# Patient Record
Sex: MALE | Race: WHITE
[De-identification: names, ages, dates, MRNs, and addresses within clinical notes are randomized per-mention and may not be internally consistent; named-entity substitution may affect disease eponyms.]

---

## 2018-07-24 ENCOUNTER — HOSPITAL ENCOUNTER (OUTPATIENT)
Dept: HOSPITAL 96 - M.WC | Age: 48
End: 2018-07-24
Attending: EMERGENCY MEDICINE
Payer: COMMERCIAL

## 2018-07-24 DIAGNOSIS — L97.511: ICD-10-CM

## 2018-07-24 DIAGNOSIS — E11.621: Primary | ICD-10-CM

## 2018-07-24 DIAGNOSIS — E78.00: ICD-10-CM

## 2018-07-24 DIAGNOSIS — Z87.891: ICD-10-CM

## 2018-07-24 DIAGNOSIS — E78.5: ICD-10-CM

## 2018-07-24 DIAGNOSIS — I10: ICD-10-CM

## 2018-07-26 ENCOUNTER — HOSPITAL ENCOUNTER (OUTPATIENT)
Dept: HOSPITAL 96 - M.ULTRA | Age: 48
End: 2018-07-26
Attending: EMERGENCY MEDICINE
Payer: COMMERCIAL

## 2018-07-26 DIAGNOSIS — I70.8: Primary | ICD-10-CM

## 2018-07-26 DIAGNOSIS — I73.9: ICD-10-CM

## 2018-07-31 ENCOUNTER — HOSPITAL ENCOUNTER (OUTPATIENT)
Dept: HOSPITAL 96 - M.WC | Age: 48
End: 2018-07-31
Attending: EMERGENCY MEDICINE
Payer: COMMERCIAL

## 2018-07-31 DIAGNOSIS — E78.5: ICD-10-CM

## 2018-07-31 DIAGNOSIS — E11.40: ICD-10-CM

## 2018-07-31 DIAGNOSIS — Z85.820: ICD-10-CM

## 2018-07-31 DIAGNOSIS — E11.621: Primary | ICD-10-CM

## 2018-07-31 DIAGNOSIS — I10: ICD-10-CM

## 2018-07-31 DIAGNOSIS — L97.511: ICD-10-CM

## 2018-07-31 DIAGNOSIS — E78.00: ICD-10-CM

## 2018-07-31 DIAGNOSIS — Z87.891: ICD-10-CM

## 2018-08-08 ENCOUNTER — HOSPITAL ENCOUNTER (OUTPATIENT)
Dept: HOSPITAL 96 - M.WC | Age: 48
End: 2018-08-08
Attending: SURGERY
Payer: COMMERCIAL

## 2018-08-08 DIAGNOSIS — E11.621: Primary | ICD-10-CM

## 2018-08-08 DIAGNOSIS — Z85.820: ICD-10-CM

## 2018-08-08 DIAGNOSIS — I10: ICD-10-CM

## 2018-08-08 DIAGNOSIS — I70.235: ICD-10-CM

## 2018-08-08 DIAGNOSIS — E78.5: ICD-10-CM

## 2018-08-08 DIAGNOSIS — Z87.891: ICD-10-CM

## 2018-08-08 DIAGNOSIS — L97.511: ICD-10-CM

## 2018-08-08 DIAGNOSIS — E78.00: ICD-10-CM

## 2018-08-15 ENCOUNTER — HOSPITAL ENCOUNTER (OUTPATIENT)
Dept: HOSPITAL 96 - M.WC | Age: 48
End: 2018-08-15
Attending: SURGERY
Payer: COMMERCIAL

## 2018-08-15 DIAGNOSIS — Z85.820: ICD-10-CM

## 2018-08-15 DIAGNOSIS — I12.9: ICD-10-CM

## 2018-08-15 DIAGNOSIS — E78.00: ICD-10-CM

## 2018-08-15 DIAGNOSIS — L97.511: ICD-10-CM

## 2018-08-15 DIAGNOSIS — E11.22: ICD-10-CM

## 2018-08-15 DIAGNOSIS — Z79.4: ICD-10-CM

## 2018-08-15 DIAGNOSIS — N18.9: ICD-10-CM

## 2018-08-15 DIAGNOSIS — I70.235: ICD-10-CM

## 2018-08-15 DIAGNOSIS — E11.621: Primary | ICD-10-CM

## 2018-08-15 DIAGNOSIS — Z87.891: ICD-10-CM

## 2018-08-15 DIAGNOSIS — E78.5: ICD-10-CM

## 2018-08-15 DIAGNOSIS — Z94.0: ICD-10-CM

## 2018-08-22 ENCOUNTER — HOSPITAL ENCOUNTER (OUTPATIENT)
Dept: HOSPITAL 96 - M.WC | Age: 48
End: 2018-08-22
Attending: SURGERY
Payer: COMMERCIAL

## 2018-08-22 DIAGNOSIS — I70.235: ICD-10-CM

## 2018-08-22 DIAGNOSIS — E66.9: ICD-10-CM

## 2018-08-22 DIAGNOSIS — E78.00: ICD-10-CM

## 2018-08-22 DIAGNOSIS — E11.621: Primary | ICD-10-CM

## 2018-08-22 DIAGNOSIS — Z79.4: ICD-10-CM

## 2018-08-22 DIAGNOSIS — E11.40: ICD-10-CM

## 2018-08-22 DIAGNOSIS — Z94.0: ICD-10-CM

## 2018-08-22 DIAGNOSIS — I12.9: ICD-10-CM

## 2018-08-22 DIAGNOSIS — E11.22: ICD-10-CM

## 2018-08-22 DIAGNOSIS — L97.511: ICD-10-CM

## 2018-08-22 DIAGNOSIS — Z87.891: ICD-10-CM

## 2018-08-22 DIAGNOSIS — Z85.820: ICD-10-CM

## 2018-08-22 DIAGNOSIS — E78.5: ICD-10-CM

## 2018-08-22 DIAGNOSIS — N18.9: ICD-10-CM

## 2021-01-05 ENCOUNTER — HOSPITAL ENCOUNTER (OUTPATIENT)
Dept: HOSPITAL 96 - M.WC | Age: 51
End: 2021-01-05
Attending: EMERGENCY MEDICINE
Payer: COMMERCIAL

## 2021-01-05 DIAGNOSIS — E78.5: ICD-10-CM

## 2021-01-05 DIAGNOSIS — L84: ICD-10-CM

## 2021-01-05 DIAGNOSIS — Z87.891: ICD-10-CM

## 2021-01-05 DIAGNOSIS — E10.621: Primary | ICD-10-CM

## 2021-01-05 DIAGNOSIS — I70.235: ICD-10-CM

## 2021-01-05 DIAGNOSIS — E10.319: ICD-10-CM

## 2021-01-05 DIAGNOSIS — Z85.820: ICD-10-CM

## 2021-01-05 DIAGNOSIS — I10: ICD-10-CM

## 2021-01-05 DIAGNOSIS — E78.00: ICD-10-CM

## 2021-01-05 DIAGNOSIS — L97.514: ICD-10-CM

## 2021-01-05 DIAGNOSIS — E10.51: ICD-10-CM

## 2021-01-11 ENCOUNTER — HOSPITAL ENCOUNTER (OUTPATIENT)
Dept: HOSPITAL 96 - M.WC | Age: 51
End: 2021-01-11
Attending: EMERGENCY MEDICINE
Payer: COMMERCIAL

## 2021-01-11 DIAGNOSIS — Z94.83: ICD-10-CM

## 2021-01-11 DIAGNOSIS — E10.319: ICD-10-CM

## 2021-01-11 DIAGNOSIS — I10: ICD-10-CM

## 2021-01-11 DIAGNOSIS — Z94.0: ICD-10-CM

## 2021-01-11 DIAGNOSIS — E10.51: ICD-10-CM

## 2021-01-11 DIAGNOSIS — E78.00: ICD-10-CM

## 2021-01-11 DIAGNOSIS — E78.5: ICD-10-CM

## 2021-01-11 DIAGNOSIS — E10.40: ICD-10-CM

## 2021-01-11 DIAGNOSIS — E10.621: Primary | ICD-10-CM

## 2021-01-11 DIAGNOSIS — Z85.820: ICD-10-CM

## 2021-01-11 DIAGNOSIS — L84: ICD-10-CM

## 2021-01-11 DIAGNOSIS — L97.514: ICD-10-CM

## 2021-01-11 DIAGNOSIS — Z87.891: ICD-10-CM

## 2021-01-11 DIAGNOSIS — I70.235: ICD-10-CM

## 2021-01-11 NOTE — EKG
[place] [address]
Phone:  [phone]                     ELECTROCARDIOGRAM REPORT      
_______________________________________________________________________________
 
Name:         SHELLY MCCONNELL                  Room:                     REG CLI
M.R.#:    [id number]     Account #:     [id number]  
Admission:    21    Attend Phys:   Real Archer,
Discharge:                Date of Birth: [birth year]  
Date of Service: 21 1437  Report #:      [id number]
        [id number]
_______________________________________________________________________________
THIS REPORT FOR:  //name//                      
 
                          [facility]
                                       
Test Date:    2021               Test Time:    14:37:14
Pat Name:     SHELLY MCCONNELL               Department:   
Patient ID:   [id number]            Room:          
Gender:                               Technician:   
:          [id number]               Requested By: Real Archer
Order Number: [id number]    Lelo MD:   Catarino Smith
                                 Measurements
Intervals                              Axis          
Rate:         102                      P:            51
SD:           139                      QRS:          21
QRSD:         90                       T:            63
QT:           308                                    
QTc:          402                                    
                           Interpretive Statements
Sinus tachycardia
No previous ECG available for comparison
Electronically Signed On 2021 16:20:51 CST by Catarino Smith
[email or web address]
 
 
 
 
 
 
 
 
 
 
 
 
 
 
 
 
 
 
 
 
 
 
  <ELECTRONICALLY SIGNED>
                                           By: Catarino Smith MD, [facility]     
  21     1620
D: 21 1437   _____________________________________
T: 21   Catarino Smith MD, FACC       /EPI

## 2021-01-12 ENCOUNTER — HOSPITAL ENCOUNTER (OUTPATIENT)
Dept: HOSPITAL 96 - M.WC | Age: 51
End: 2021-01-12
Attending: EMERGENCY MEDICINE
Payer: COMMERCIAL

## 2021-01-12 DIAGNOSIS — E78.00: ICD-10-CM

## 2021-01-12 DIAGNOSIS — L97.514: ICD-10-CM

## 2021-01-12 DIAGNOSIS — E10.621: Primary | ICD-10-CM

## 2021-01-12 DIAGNOSIS — I10: ICD-10-CM

## 2021-01-12 DIAGNOSIS — Z94.83: ICD-10-CM

## 2021-01-12 DIAGNOSIS — I70.235: ICD-10-CM

## 2021-01-12 DIAGNOSIS — E10.51: ICD-10-CM

## 2021-01-12 DIAGNOSIS — Z87.891: ICD-10-CM

## 2021-01-12 DIAGNOSIS — Z94.0: ICD-10-CM

## 2021-01-12 DIAGNOSIS — Z85.820: ICD-10-CM

## 2021-01-12 DIAGNOSIS — E78.5: ICD-10-CM

## 2021-01-12 DIAGNOSIS — E10.319: ICD-10-CM

## 2021-01-13 ENCOUNTER — HOSPITAL ENCOUNTER (OUTPATIENT)
Dept: HOSPITAL 96 - M.WC | Age: 51
End: 2021-01-13
Attending: SURGERY
Payer: COMMERCIAL

## 2021-01-13 DIAGNOSIS — I10: ICD-10-CM

## 2021-01-13 DIAGNOSIS — E78.5: ICD-10-CM

## 2021-01-13 DIAGNOSIS — Z94.0: ICD-10-CM

## 2021-01-13 DIAGNOSIS — Z94.83: ICD-10-CM

## 2021-01-13 DIAGNOSIS — I70.235: ICD-10-CM

## 2021-01-13 DIAGNOSIS — L97.514: ICD-10-CM

## 2021-01-13 DIAGNOSIS — Z87.891: ICD-10-CM

## 2021-01-13 DIAGNOSIS — E10.319: ICD-10-CM

## 2021-01-13 DIAGNOSIS — E78.00: ICD-10-CM

## 2021-01-13 DIAGNOSIS — Z85.820: ICD-10-CM

## 2021-01-13 DIAGNOSIS — E10.51: ICD-10-CM

## 2021-01-13 DIAGNOSIS — E10.621: Primary | ICD-10-CM

## 2021-01-14 ENCOUNTER — HOSPITAL ENCOUNTER (OUTPATIENT)
Dept: HOSPITAL 96 - M.WC | Age: 51
End: 2021-01-14
Attending: FAMILY MEDICINE
Payer: COMMERCIAL

## 2021-01-14 DIAGNOSIS — E10.621: Primary | ICD-10-CM

## 2021-01-14 DIAGNOSIS — E10.319: ICD-10-CM

## 2021-01-14 DIAGNOSIS — I10: ICD-10-CM

## 2021-01-14 DIAGNOSIS — E78.00: ICD-10-CM

## 2021-01-14 DIAGNOSIS — Z87.891: ICD-10-CM

## 2021-01-14 DIAGNOSIS — I70.235: ICD-10-CM

## 2021-01-14 DIAGNOSIS — E78.5: ICD-10-CM

## 2021-01-14 DIAGNOSIS — L97.514: ICD-10-CM

## 2021-01-14 DIAGNOSIS — E10.51: ICD-10-CM

## 2021-01-15 ENCOUNTER — HOSPITAL ENCOUNTER (OUTPATIENT)
Dept: HOSPITAL 96 - M.WC | Age: 51
End: 2021-01-15
Attending: FAMILY MEDICINE
Payer: COMMERCIAL

## 2021-01-15 DIAGNOSIS — E10.621: Primary | ICD-10-CM

## 2021-01-15 DIAGNOSIS — E78.5: ICD-10-CM

## 2021-01-15 DIAGNOSIS — E10.319: ICD-10-CM

## 2021-01-15 DIAGNOSIS — L97.514: ICD-10-CM

## 2021-01-15 DIAGNOSIS — Z87.891: ICD-10-CM

## 2021-01-15 DIAGNOSIS — I10: ICD-10-CM

## 2021-01-15 DIAGNOSIS — E10.51: ICD-10-CM

## 2021-01-15 DIAGNOSIS — I70.235: ICD-10-CM

## 2021-01-15 DIAGNOSIS — E78.00: ICD-10-CM

## 2021-01-18 ENCOUNTER — HOSPITAL ENCOUNTER (OUTPATIENT)
Dept: HOSPITAL 96 - M.WC | Age: 51
End: 2021-01-18
Payer: COMMERCIAL

## 2021-01-18 DIAGNOSIS — E10.319: ICD-10-CM

## 2021-01-18 DIAGNOSIS — E78.5: ICD-10-CM

## 2021-01-18 DIAGNOSIS — Z87.891: ICD-10-CM

## 2021-01-18 DIAGNOSIS — L97.514: ICD-10-CM

## 2021-01-18 DIAGNOSIS — E10.621: Primary | ICD-10-CM

## 2021-01-18 DIAGNOSIS — E78.00: ICD-10-CM

## 2021-01-18 DIAGNOSIS — Z94.0: ICD-10-CM

## 2021-01-18 DIAGNOSIS — I10: ICD-10-CM

## 2021-01-18 DIAGNOSIS — Z94.83: ICD-10-CM

## 2021-01-18 DIAGNOSIS — E10.51: ICD-10-CM

## 2021-01-18 DIAGNOSIS — Z85.820: ICD-10-CM

## 2021-01-18 DIAGNOSIS — I70.235: ICD-10-CM

## 2021-01-19 ENCOUNTER — HOSPITAL ENCOUNTER (OUTPATIENT)
Dept: HOSPITAL 96 - M.WC | Age: 51
End: 2021-01-19
Attending: EMERGENCY MEDICINE
Payer: COMMERCIAL

## 2021-01-19 DIAGNOSIS — Z94.83: ICD-10-CM

## 2021-01-19 DIAGNOSIS — E10.319: ICD-10-CM

## 2021-01-19 DIAGNOSIS — L97.514: ICD-10-CM

## 2021-01-19 DIAGNOSIS — I10: ICD-10-CM

## 2021-01-19 DIAGNOSIS — E10.621: Primary | ICD-10-CM

## 2021-01-19 DIAGNOSIS — Z87.891: ICD-10-CM

## 2021-01-19 DIAGNOSIS — Z94.0: ICD-10-CM

## 2021-01-19 DIAGNOSIS — I70.235: ICD-10-CM

## 2021-01-19 DIAGNOSIS — E78.5: ICD-10-CM

## 2021-01-19 DIAGNOSIS — Z85.820: ICD-10-CM

## 2021-01-19 DIAGNOSIS — E10.51: ICD-10-CM

## 2021-01-19 DIAGNOSIS — E78.00: ICD-10-CM

## 2021-01-20 ENCOUNTER — HOSPITAL ENCOUNTER (OUTPATIENT)
Dept: HOSPITAL 96 - M.WC | Age: 51
End: 2021-01-20
Attending: SURGERY
Payer: COMMERCIAL

## 2021-01-20 DIAGNOSIS — I10: ICD-10-CM

## 2021-01-20 DIAGNOSIS — E10.51: ICD-10-CM

## 2021-01-20 DIAGNOSIS — L97.514: ICD-10-CM

## 2021-01-20 DIAGNOSIS — E10.621: Primary | ICD-10-CM

## 2021-01-20 DIAGNOSIS — I70.235: ICD-10-CM

## 2021-01-20 DIAGNOSIS — E10.319: ICD-10-CM

## 2021-01-20 DIAGNOSIS — E78.00: ICD-10-CM

## 2021-01-20 DIAGNOSIS — Z85.820: ICD-10-CM

## 2021-01-20 DIAGNOSIS — Z87.891: ICD-10-CM

## 2021-01-20 DIAGNOSIS — Z94.83: ICD-10-CM

## 2021-01-20 DIAGNOSIS — E78.5: ICD-10-CM

## 2021-01-20 DIAGNOSIS — Z94.0: ICD-10-CM

## 2021-01-21 ENCOUNTER — HOSPITAL ENCOUNTER (OUTPATIENT)
Dept: HOSPITAL 96 - M.WC | Age: 51
End: 2021-01-21
Attending: SURGERY
Payer: COMMERCIAL

## 2021-01-21 DIAGNOSIS — E78.5: ICD-10-CM

## 2021-01-21 DIAGNOSIS — E10.319: ICD-10-CM

## 2021-01-21 DIAGNOSIS — Z85.820: ICD-10-CM

## 2021-01-21 DIAGNOSIS — E10.621: Primary | ICD-10-CM

## 2021-01-21 DIAGNOSIS — I10: ICD-10-CM

## 2021-01-21 DIAGNOSIS — Z87.891: ICD-10-CM

## 2021-01-21 DIAGNOSIS — Z94.0: ICD-10-CM

## 2021-01-21 DIAGNOSIS — E10.51: ICD-10-CM

## 2021-01-21 DIAGNOSIS — L97.514: ICD-10-CM

## 2021-01-21 DIAGNOSIS — E78.00: ICD-10-CM

## 2021-01-21 DIAGNOSIS — I70.235: ICD-10-CM

## 2021-01-21 DIAGNOSIS — Z94.83: ICD-10-CM

## 2021-01-22 ENCOUNTER — HOSPITAL ENCOUNTER (OUTPATIENT)
Dept: HOSPITAL 96 - M.WC | Age: 51
End: 2021-01-22
Attending: FAMILY MEDICINE
Payer: COMMERCIAL

## 2021-01-22 DIAGNOSIS — E78.5: ICD-10-CM

## 2021-01-22 DIAGNOSIS — Z87.891: ICD-10-CM

## 2021-01-22 DIAGNOSIS — E10.319: ICD-10-CM

## 2021-01-22 DIAGNOSIS — I70.235: ICD-10-CM

## 2021-01-22 DIAGNOSIS — E10.621: Primary | ICD-10-CM

## 2021-01-22 DIAGNOSIS — E78.00: ICD-10-CM

## 2021-01-22 DIAGNOSIS — I10: ICD-10-CM

## 2021-01-22 DIAGNOSIS — Z94.83: ICD-10-CM

## 2021-01-22 DIAGNOSIS — E10.51: ICD-10-CM

## 2021-01-22 DIAGNOSIS — L97.514: ICD-10-CM

## 2021-01-22 DIAGNOSIS — Z85.820: ICD-10-CM

## 2021-01-22 DIAGNOSIS — Z94.0: ICD-10-CM
